# Patient Record
Sex: FEMALE | HISPANIC OR LATINO | Employment: OTHER | URBAN - METROPOLITAN AREA
[De-identification: names, ages, dates, MRNs, and addresses within clinical notes are randomized per-mention and may not be internally consistent; named-entity substitution may affect disease eponyms.]

---
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
## 2023-10-01 NOTE — PROGRESS NOTES
New Patient    Patient Care Team:  Luis Pineda M.D. as PCP - General (Internal Medicine)    Angella Chacko is a 23 y.o. female who presents today to establish and with the following Chief Complaint(s): Follow up for Diagnoses of Recurrent cold sores, Irregular menstrual cycle, Abrasion of labia, initial encounter, Women's annual routine gynecological examination, Normocytic anemia, Hypokalemia, Constipation, unspecified constipation type, Screening examination for STD (sexually transmitted disease), Encounter for hepatitis C screening test for low risk patient, and Need for tetanus booster were pertinent to this visit.    Past labs    *Leukocytosis  *Normocytic anemia  -Both these were seen on labs done in 2009  *Hypokalemia        Sexual history  -Sexually active with the being partner for the past 1 year, see uses protection, never been pregnant.  -Oral contraceptive use, never been tested for STDs, but to getting them tested.  -Positive for irregular menstrual cycles, is not sure but may have been regular since menarche however more recently became more irregular.  -Happens mostly for 4 days, with 1 day break and then restarts again the sixth day.  Does not report any dysmenorrhea, menorrhagia at this time.  -Has had a history of anemia as per patient.  -Denies any abnormal vaginal discharge however does report that she sometimes get stuck caught on the lip area which is very abnormal and would like to see an OB/GYN specialist for it.  Examination deferred on this visit    Social history.  -Occasional alcohol.  -Smokes marijuana  -Does not drink any alcohol only occasionally.  -Works currently, is self-employed.      *family history.  -Grandmother with breast cancer, does not recall age, no other cancers, premature heart disease or lung disease reported.        ROS:     Denies any new chest pain or shortness of breath.  No changes to urinary or bowel function.   See HPI.          No past  "medical history on file.  Social History     Tobacco Use    Smoking status: Never   Vaping Use    Vaping Use: Never used   Substance Use Topics    Alcohol use: Not Currently     Comment: ocassional    Drug use: Yes     Types: Marijuana, Inhaled     Current Outpatient Medications   Medication Sig Dispense Refill    MethylPREDNISolone (MEDROL DOSEPAK) 4 MG Tablet Therapy Pack UAD (Patient not taking: Reported on 10/2/2023) 1 Kit 0    TRI-SPRINTEC 0.18/0.215/0.25 MG-35 MCG Tab  (Patient not taking: Reported on 10/2/2023)      promethazine (PHENERGAN) 12.5 MG SUPP Insert 1 Suppository in rectum every 6 hours as needed for Nausea/Vomiting. (Patient not taking: Reported on 10/2/2023) 4 Suppository 0    famotidine (PEPCID) 20 MG TABS Take 1 Tab by mouth 2 times a day. (Patient not taking: Reported on 10/2/2023) 60 Each 0    loperamide (IMMODIUM) 1 MG/5ML LIQD Take 5 mL by mouth 4 times a day as needed. (Patient not taking: Reported on 10/2/2023) 100 mL 0     No current facility-administered medications for this visit.           Physical Exam:  /67 (BP Location: Right arm, Patient Position: Sitting, BP Cuff Size: Adult)   Pulse 60   Temp 36.8 °C (98.2 °F) (Temporal)   Ht 1.62 m (5' 3.78\")   SpO2 95%   BMI 29.21 kg/m²   General: Well developed, well nourished female, in no distress.  Eyes: Conjuntiva without any obvious injection or erythema.   Cardiovascular: Heart is regular with no murmur  Lungs: Clear to auscultation bilaterally. No wheezes, rhonci or crackles heard. Respiratory effort is normal.  Abd: Soft, non-tender  Ext: No edema            HPI / Assessment / Plan:    1. Recurrent canker sores    -In the lower lip, at the same spot recurrent, more frequent recently.  Has had it for a few years, at least 3 years  -However most frequently is happening almost every week.  -No vesicles reported hence less likely cold sores.   -No other autoimmune symptoms  -Patient has been under a lot of stress because she is " self-employed and is also an over thinker as per patient. Does have irregular menstrual cycles, hence may have hormonal fluctuations   -Has not had any STD screening at this time.  -On examination patient does have slight remnant of the cold sore which is healing at this time, patient also has a reading on her phone of the cold sore, with appearance of slight blood in the base.  -Patient does have braces, gets that revised, has had braces since 2020, does have injury from the braces on the cold sore.  -They do get painful, has not had source anywhere else inside the mouth or outside on the face    Plan     Rule out immunodeficiencies   - HEP C VIRUS ANTIBODY; Future  Lidocaine as needed.   Management of stress. Avoid biting trauma - braces may be contributing   Low suspicion for starting antiviral.     2. Irregular menstrual cycle    - TSH WITH REFLEX TO FT4; Future  - Referral to OB/Gyn    3. Abrasion of labia, initial encounter    - Referral to OB/Gyn    4. Women's annual routine gynecological examination    - Referral to OB/Gyn    5. Normocytic anemia    - CBC WITH DIFFERENTIAL; Future    6. Hypokalemia    - Comp Metabolic Panel; Future    7. Constipation, unspecified constipation type    - Comp Metabolic Panel; Future  - TSH WITH REFLEX TO FT4; Future    8. Screening examination for STD (sexually transmitted disease)    - Chlamydia/GC, PCR (Urine); Future  - T.PALLIDUM AB BEAU (SCREENING); Future  - HIV AG/AB COMBO ASSAY SCREENING; Future    9. Encounter for hepatitis C screening test for low risk patient    - HEP C VIRUS ANTIBODY; Future    10. Need for tetanus booster    - Tdap Vaccine =>6YO IM             Orders Placed This Encounter    Tdap Vaccine =>6YO IM    CBC WITH DIFFERENTIAL    Comp Metabolic Panel    TSH WITH REFLEX TO FT4    Chlamydia/GC, PCR (Urine)    T.PALLIDUM AB BEAU (SCREENING)    HIV AG/AB COMBO ASSAY SCREENING    HEP C VIRUS ANTIBODY    Referral to OB/Gyn           Return in about 1 month  (around 11/2/2023).    This note was created using voice recognition software.  While every attempt is made to ensure accuracy of transcription, occasionally errors occur.

## 2023-10-02 ENCOUNTER — OFFICE VISIT (OUTPATIENT)
Dept: INTERNAL MEDICINE | Facility: OTHER | Age: 23
End: 2023-10-02
Payer: COMMERCIAL

## 2023-10-02 VITALS
DIASTOLIC BLOOD PRESSURE: 67 MMHG | TEMPERATURE: 98.2 F | HEART RATE: 60 BPM | HEIGHT: 64 IN | SYSTOLIC BLOOD PRESSURE: 108 MMHG | OXYGEN SATURATION: 95 % | BODY MASS INDEX: 29.21 KG/M2

## 2023-10-02 DIAGNOSIS — K12.0 RECURRENT CANKER SORES: ICD-10-CM

## 2023-10-02 DIAGNOSIS — N92.6 IRREGULAR MENSTRUAL CYCLE: ICD-10-CM

## 2023-10-02 DIAGNOSIS — Z11.3 SCREENING EXAMINATION FOR STD (SEXUALLY TRANSMITTED DISEASE): ICD-10-CM

## 2023-10-02 DIAGNOSIS — S30.814A ABRASION OF LABIA, INITIAL ENCOUNTER: ICD-10-CM

## 2023-10-02 DIAGNOSIS — E87.6 HYPOKALEMIA: ICD-10-CM

## 2023-10-02 DIAGNOSIS — Z11.59 ENCOUNTER FOR HEPATITIS C SCREENING TEST FOR LOW RISK PATIENT: ICD-10-CM

## 2023-10-02 DIAGNOSIS — K59.00 CONSTIPATION, UNSPECIFIED CONSTIPATION TYPE: ICD-10-CM

## 2023-10-02 DIAGNOSIS — Z01.419 WOMEN'S ANNUAL ROUTINE GYNECOLOGICAL EXAMINATION: ICD-10-CM

## 2023-10-02 DIAGNOSIS — D64.9 NORMOCYTIC ANEMIA: ICD-10-CM

## 2023-10-02 DIAGNOSIS — Z23 NEED FOR TETANUS BOOSTER: ICD-10-CM

## 2023-10-02 PROBLEM — B00.1 RECURRENT COLD SORES: Status: ACTIVE | Noted: 2023-10-02

## 2023-10-02 PROCEDURE — 99203 OFFICE O/P NEW LOW 30 MIN: CPT | Mod: 25 | Performed by: INTERNAL MEDICINE

## 2023-10-02 PROCEDURE — 3074F SYST BP LT 130 MM HG: CPT | Performed by: INTERNAL MEDICINE

## 2023-10-02 PROCEDURE — 90471 IMMUNIZATION ADMIN: CPT | Performed by: INTERNAL MEDICINE

## 2023-10-02 PROCEDURE — 90715 TDAP VACCINE 7 YRS/> IM: CPT | Performed by: INTERNAL MEDICINE

## 2023-10-02 PROCEDURE — 3078F DIAST BP <80 MM HG: CPT | Performed by: INTERNAL MEDICINE

## 2023-10-02 RX ORDER — LIDOCAINE HYDROCHLORIDE 20 MG/ML
5 SOLUTION OROPHARYNGEAL PRN
Qty: 100 ML | Refills: 1 | Status: SHIPPED | OUTPATIENT
Start: 2023-10-02

## 2023-10-02 ASSESSMENT — PATIENT HEALTH QUESTIONNAIRE - PHQ9: CLINICAL INTERPRETATION OF PHQ2 SCORE: 0

## 2023-10-23 ENCOUNTER — HOSPITAL ENCOUNTER (OUTPATIENT)
Dept: LAB | Facility: MEDICAL CENTER | Age: 23
End: 2023-10-23
Attending: INTERNAL MEDICINE
Payer: COMMERCIAL

## 2023-10-23 ENCOUNTER — TELEPHONE (OUTPATIENT)
Dept: INTERNAL MEDICINE | Facility: OTHER | Age: 23
End: 2023-10-23

## 2023-10-23 DIAGNOSIS — Z11.59 ENCOUNTER FOR HEPATITIS C SCREENING TEST FOR LOW RISK PATIENT: ICD-10-CM

## 2023-10-23 DIAGNOSIS — K59.00 CONSTIPATION, UNSPECIFIED CONSTIPATION TYPE: ICD-10-CM

## 2023-10-23 DIAGNOSIS — N92.6 IRREGULAR MENSTRUAL CYCLE: ICD-10-CM

## 2023-10-23 DIAGNOSIS — K12.0 RECURRENT CANKER SORES: ICD-10-CM

## 2023-10-23 DIAGNOSIS — Z11.3 SCREENING EXAMINATION FOR STD (SEXUALLY TRANSMITTED DISEASE): ICD-10-CM

## 2023-10-23 DIAGNOSIS — D64.9 NORMOCYTIC ANEMIA: ICD-10-CM

## 2023-10-23 DIAGNOSIS — E87.6 HYPOKALEMIA: ICD-10-CM

## 2023-10-23 LAB
ALBUMIN SERPL BCP-MCNC: 4.2 G/DL (ref 3.2–4.9)
ALBUMIN/GLOB SERPL: 1.4 G/DL
ALP SERPL-CCNC: 75 U/L (ref 30–99)
ALT SERPL-CCNC: 14 U/L (ref 2–50)
ANION GAP SERPL CALC-SCNC: 8 MMOL/L (ref 7–16)
AST SERPL-CCNC: 21 U/L (ref 12–45)
BASOPHILS # BLD AUTO: 1.1 % (ref 0–1.8)
BASOPHILS # BLD: 0.07 K/UL (ref 0–0.12)
BILIRUB SERPL-MCNC: 0.4 MG/DL (ref 0.1–1.5)
BUN SERPL-MCNC: 11 MG/DL (ref 8–22)
C TRACH DNA SPEC QL NAA+PROBE: NEGATIVE
CALCIUM ALBUM COR SERPL-MCNC: 9.1 MG/DL (ref 8.5–10.5)
CALCIUM SERPL-MCNC: 9.3 MG/DL (ref 8.5–10.5)
CHLORIDE SERPL-SCNC: 106 MMOL/L (ref 96–112)
CO2 SERPL-SCNC: 24 MMOL/L (ref 20–33)
CREAT SERPL-MCNC: 0.59 MG/DL (ref 0.5–1.4)
CRP SERPL HS-MCNC: <0.3 MG/DL (ref 0–0.75)
EOSINOPHIL # BLD AUTO: 0.45 K/UL (ref 0–0.51)
EOSINOPHIL NFR BLD: 6.8 % (ref 0–6.9)
ERYTHROCYTE [DISTWIDTH] IN BLOOD BY AUTOMATED COUNT: 42.3 FL (ref 35.9–50)
ERYTHROCYTE [SEDIMENTATION RATE] IN BLOOD BY WESTERGREN METHOD: 4 MM/HOUR (ref 0–25)
FASTING STATUS PATIENT QL REPORTED: NORMAL
FERRITIN SERPL-MCNC: 12.3 NG/ML (ref 10–291)
FOLATE SERPL-MCNC: 9.9 NG/ML
GFR SERPLBLD CREATININE-BSD FMLA CKD-EPI: 129 ML/MIN/1.73 M 2
GLOBULIN SER CALC-MCNC: 2.9 G/DL (ref 1.9–3.5)
GLUCOSE SERPL-MCNC: 93 MG/DL (ref 65–99)
HCT VFR BLD AUTO: 40.9 % (ref 37–47)
HCV AB SER QL: NORMAL
HGB BLD-MCNC: 13.7 G/DL (ref 12–16)
HIV 1+2 AB+HIV1 P24 AG SERPL QL IA: NORMAL
IMM GRANULOCYTES # BLD AUTO: 0.02 K/UL (ref 0–0.11)
IMM GRANULOCYTES NFR BLD AUTO: 0.3 % (ref 0–0.9)
IRON SATN MFR SERPL: 20 % (ref 15–55)
IRON SERPL-MCNC: 80 UG/DL (ref 40–170)
LYMPHOCYTES # BLD AUTO: 2.1 K/UL (ref 1–4.8)
LYMPHOCYTES NFR BLD: 31.5 % (ref 22–41)
MCH RBC QN AUTO: 28.9 PG (ref 27–33)
MCHC RBC AUTO-ENTMCNC: 33.5 G/DL (ref 32.2–35.5)
MCV RBC AUTO: 86.3 FL (ref 81.4–97.8)
MONOCYTES # BLD AUTO: 0.43 K/UL (ref 0–0.85)
MONOCYTES NFR BLD AUTO: 6.5 % (ref 0–13.4)
N GONORRHOEA DNA SPEC QL NAA+PROBE: NEGATIVE
NEUTROPHILS # BLD AUTO: 3.59 K/UL (ref 1.82–7.42)
NEUTROPHILS NFR BLD: 53.8 % (ref 44–72)
NRBC # BLD AUTO: 0 K/UL
NRBC BLD-RTO: 0 /100 WBC (ref 0–0.2)
PLATELET # BLD AUTO: 236 K/UL (ref 164–446)
PMV BLD AUTO: 12 FL (ref 9–12.9)
POTASSIUM SERPL-SCNC: 4.5 MMOL/L (ref 3.6–5.5)
PROT SERPL-MCNC: 7.1 G/DL (ref 6–8.2)
RBC # BLD AUTO: 4.74 M/UL (ref 4.2–5.4)
SODIUM SERPL-SCNC: 138 MMOL/L (ref 135–145)
SPECIMEN SOURCE: NORMAL
T PALLIDUM AB SER QL IA: NORMAL
TIBC SERPL-MCNC: 394 UG/DL (ref 250–450)
TSH SERPL DL<=0.005 MIU/L-ACNC: 1.26 UIU/ML (ref 0.38–5.33)
UIBC SERPL-MCNC: 314 UG/DL (ref 110–370)
VIT B12 SERPL-MCNC: 527 PG/ML (ref 211–911)
WBC # BLD AUTO: 6.7 K/UL (ref 4.8–10.8)

## 2023-10-23 PROCEDURE — 36415 COLL VENOUS BLD VENIPUNCTURE: CPT

## 2023-10-23 PROCEDURE — 83540 ASSAY OF IRON: CPT

## 2023-10-23 PROCEDURE — 80053 COMPREHEN METABOLIC PANEL: CPT

## 2023-10-23 PROCEDURE — 86140 C-REACTIVE PROTEIN: CPT

## 2023-10-23 PROCEDURE — 87389 HIV-1 AG W/HIV-1&-2 AB AG IA: CPT

## 2023-10-23 PROCEDURE — 83550 IRON BINDING TEST: CPT

## 2023-10-23 PROCEDURE — 87491 CHLMYD TRACH DNA AMP PROBE: CPT

## 2023-10-23 PROCEDURE — 84443 ASSAY THYROID STIM HORMONE: CPT

## 2023-10-23 PROCEDURE — 82746 ASSAY OF FOLIC ACID SERUM: CPT

## 2023-10-23 PROCEDURE — 85652 RBC SED RATE AUTOMATED: CPT

## 2023-10-23 PROCEDURE — 86780 TREPONEMA PALLIDUM: CPT

## 2023-10-23 PROCEDURE — 82728 ASSAY OF FERRITIN: CPT

## 2023-10-23 PROCEDURE — 86803 HEPATITIS C AB TEST: CPT

## 2023-10-23 PROCEDURE — 85025 COMPLETE CBC W/AUTO DIFF WBC: CPT

## 2023-10-23 PROCEDURE — 82607 VITAMIN B-12: CPT

## 2023-10-23 PROCEDURE — 87591 N.GONORRHOEAE DNA AMP PROB: CPT

## 2023-10-23 NOTE — TELEPHONE ENCOUNTER
Adding labs to current labs for work up of recurrent aphthous ulcers.   Esr crp , b12 , folate, zinc

## 2023-10-29 NOTE — PROGRESS NOTES
New Patient    Patient Care Team:  Lius Pineda M.D. as PCP - General (Internal Medicine)    Angella Chacko is a 23 y.o. female who presents today to establish and with the following Chief Complaint(s): Follow up for Diagnoses of Recurrent canker sores and Irregular menstrual cycle were pertinent to this visit.    -Patient is here for follow-up of labs.  -Patient has not had any of the canker sores mentioned on the last visit since last visit, continues to have slight remnant scar from this lesion.  -Patient recently had her braces changed this morning, has plans of having braces for another 6 months.  -Labs including potassium levels, WBC, ferritin, vitamin B12, ESR CRP within normal limits        Sexual history  -Sexually active with the being partner for the past 1 year, see uses protection, never been pregnant.  -Oral contraceptive use, never been tested for STDs, but to getting them tested.  -Positive for irregular menstrual cycles, is not sure but may have been regular since menarche however more recently became more irregular.  -Happens mostly for 4 days, with 1 day break and then restarts again the sixth day.  Does not report any dysmenorrhea, menorrhagia at this time.  -Has had a history of anemia as per patient.  -Denies any abnormal vaginal discharge however does report that she sometimes get stuck caught on the lip area which is very abnormal and would like to see an OB/GYN specialist for it.  Examination deferred on this visit    Social history.  -Occasional alcohol.  -Smokes marijuana  -Does not drink any alcohol only occasionally.  -Works currently, is self-employed.      *family history.  -Grandmother with breast cancer, does not recall age, no other cancers, premature heart disease or lung disease reported.        ROS:     Denies any new chest pain or shortness of breath.  No changes to urinary or bowel function.   See HPI.          No past medical history on file.  Social History  "    Tobacco Use    Smoking status: Never   Vaping Use    Vaping Use: Never used   Substance Use Topics    Alcohol use: Yes     Comment: ocassional    Drug use: Yes     Types: Marijuana, Inhaled     Current Outpatient Medications   Medication Sig Dispense Refill    clobetasol (TEMOVATE) 0.05 % Ointment Apply 1 Application topically 2 times a day. For not more than 2 weeks 16 g 0    lidocaine (XYLOCAINE) 2 % Solution Take 5 mL by mouth as needed for Other (to apply on sore on lip. Apply 1-5 ml). (Patient not taking: Reported on 10/30/2023) 100 mL 1     No current facility-administered medications for this visit.           Physical Exam:  BP (!) 83/53 (BP Location: Left arm, Patient Position: Sitting, BP Cuff Size: Adult)   Pulse 78   Temp 36.9 °C (98.5 °F) (Temporal)   Ht 1.62 m (5' 3.78\")   Wt 81.2 kg (179 lb)   SpO2 97%   BMI 30.94 kg/m²   General: Well developed, well nourished female, in no distress.  Eyes: Conjuntiva without any obvious injection or erythema.   Cardiovascular: Heart is regular with no murmur  Lungs: Clear to auscultation bilaterally. No wheezes, rhonci or crackles heard. Respiratory effort is normal.  Abd: Soft, non-tender  Ext: No edema  Skin: There is a slight lesion on the lower lip suggestive of an old canker sore.            HPI / Assessment / Plan:    1. Recurrent canker sores    -Canker sore likely associated with stress, patient is less stressed now has not had recurrence, however patient also has had braces changed recently, is planning to have braces for another 6 months.  -Patient has not needed lidocaine gel that I prescribed last week.  -Patient continues to have a small scab from previous canker sore, if continues to persist, will try triamcinolone ointment which has been prescribed, and also addressed this with dentist to see if there is a way to reduce trauma from the braces.  -No evidence of autoimmune symptoms, ESR CRP within normal limits.      2. Irregular menstrual " cycle    Thyroid function within normal limits, referral to OB/GYN has been placed, patient has not followed them with them yet.  -Patient attributes her irregular menstrual cycles to her  working as an  due to possible chemical exposure..  -Encourage patient to follow-up with gynecology for cervical cancer screening.              Orders Placed This Encounter    clobetasol (TEMOVATE) 0.05 % Ointment           Return in about 6 months (around 4/30/2024).    This note was created using voice recognition software.  While every attempt is made to ensure accuracy of transcription, occasionally errors occur.

## 2023-10-30 ENCOUNTER — OFFICE VISIT (OUTPATIENT)
Dept: INTERNAL MEDICINE | Facility: OTHER | Age: 23
End: 2023-10-30
Payer: COMMERCIAL

## 2023-10-30 VITALS
DIASTOLIC BLOOD PRESSURE: 53 MMHG | HEIGHT: 64 IN | TEMPERATURE: 98.5 F | OXYGEN SATURATION: 97 % | HEART RATE: 78 BPM | SYSTOLIC BLOOD PRESSURE: 83 MMHG | WEIGHT: 179 LBS | BODY MASS INDEX: 30.56 KG/M2

## 2023-10-30 DIAGNOSIS — K12.0 RECURRENT CANKER SORES: ICD-10-CM

## 2023-10-30 DIAGNOSIS — N92.6 IRREGULAR MENSTRUAL CYCLE: ICD-10-CM

## 2023-10-30 PROCEDURE — 99213 OFFICE O/P EST LOW 20 MIN: CPT | Performed by: INTERNAL MEDICINE

## 2023-10-30 PROCEDURE — 3074F SYST BP LT 130 MM HG: CPT | Performed by: INTERNAL MEDICINE

## 2023-10-30 PROCEDURE — 3078F DIAST BP <80 MM HG: CPT | Performed by: INTERNAL MEDICINE

## 2023-10-30 RX ORDER — CLOBETASOL PROPIONATE 0.5 MG/G
1 OINTMENT TOPICAL 2 TIMES DAILY
Qty: 16 G | Refills: 0 | Status: SHIPPED | OUTPATIENT
Start: 2023-10-30

## 2023-10-30 ASSESSMENT — FIBROSIS 4 INDEX: FIB4 SCORE: 0.57
